# Patient Record
Sex: MALE | Race: WHITE | NOT HISPANIC OR LATINO | Employment: FULL TIME | URBAN - METROPOLITAN AREA
[De-identification: names, ages, dates, MRNs, and addresses within clinical notes are randomized per-mention and may not be internally consistent; named-entity substitution may affect disease eponyms.]

---

## 2019-07-01 ENCOUNTER — OFFICE VISIT (OUTPATIENT)
Dept: SPORTS MEDICINE | Facility: CLINIC | Age: 44
End: 2019-07-01

## 2019-07-01 VITALS
HEART RATE: 84 BPM | DIASTOLIC BLOOD PRESSURE: 60 MMHG | BODY MASS INDEX: 24.39 KG/M2 | HEIGHT: 73 IN | WEIGHT: 184 LBS | OXYGEN SATURATION: 99 % | SYSTOLIC BLOOD PRESSURE: 140 MMHG

## 2019-07-01 DIAGNOSIS — R41.840 DIFFICULTY CONCENTRATING: Primary | ICD-10-CM

## 2019-07-01 DIAGNOSIS — R03.0 ELEVATED BLOOD PRESSURE READING WITHOUT DIAGNOSIS OF HYPERTENSION: ICD-10-CM

## 2019-07-01 DIAGNOSIS — R45.4 IRRITABILITY: ICD-10-CM

## 2019-07-01 DIAGNOSIS — G47.9 SLEEP DISTURBANCE: ICD-10-CM

## 2019-07-01 PROCEDURE — 99204 OFFICE O/P NEW MOD 45 MIN: CPT | Performed by: FAMILY MEDICINE

## 2019-07-01 RX ORDER — ATOMOXETINE 40 MG/1
40 CAPSULE ORAL DAILY
Qty: 90 CAPSULE | Refills: 0 | Status: SHIPPED | OUTPATIENT
Start: 2019-07-01 | End: 2019-07-15

## 2019-07-01 RX ORDER — CALCIPOTRIENE, BETAMETHASONE DIPROPIONATE 50; .643 UG/G; MG/G
OINTMENT TOPICAL DAILY
COMMUNITY
End: 2022-10-06

## 2019-07-01 NOTE — PROGRESS NOTES
"Emanuel is a 43 y.o. year old male evaluation of a problem that is new to this examiner.    Chief Complaint   Patient presents with   • Establish Care     wants to talk about ADHD problems he has been having        History of Present Illness   HPI     Prescribed Adderall XR 20 mg daily x 7 wks. Was recommended by previous physician to help with concentration. Had dry mouth but more troubling was difficulty sleeping due to Rx. Stopped Rx on his own 1 wk ago. Inability to organize tasks. Difficulty initiating tasks that are not interesting, especially with work. Impatience. Feeling of jumping out of skin. Mind constantly racing. Irritability.     No problems with BP in past - states systolic was in 120s at last check up.     I have reviewed the patient's medical, family, and social history in detail and updated the computerized patient record.    Review of Systems   Constitutional: Negative for chills, fatigue and fever.   HENT: Negative for postnasal drip and sore throat.    Eyes: Negative for pain.   Respiratory: Negative for cough, chest tightness and wheezing.    Cardiovascular: Negative for chest pain.   Gastrointestinal: Negative.    Musculoskeletal: Negative for myalgias.   Skin: Negative for rash.   Neurological: Negative for numbness and headaches.   Psychiatric/Behavioral: Positive for decreased concentration and sleep disturbance.   All other systems reviewed and are negative.      /60 (BP Location: Right arm, Patient Position: Sitting, Cuff Size: Adult)   Pulse 84   Ht 185.4 cm (73\")   Wt 83.5 kg (184 lb)   SpO2 99%   BMI 24.28 kg/m²      Physical Exam   Constitutional: He is oriented to person, place, and time. He appears well-developed and well-nourished.   HENT:   Head: Normocephalic and atraumatic.   Right Ear: External ear normal.   Left Ear: External ear normal.   Nose: Nose normal.   Mouth/Throat: Oropharynx is clear and moist.   Eyes: EOM are normal.   Neck: Normal range of motion. "   Cardiovascular: Normal rate and regular rhythm.   Pulmonary/Chest: Effort normal and breath sounds normal.   Neurological: He is alert and oriented to person, place, and time.   Skin: Skin is warm and dry. No rash noted.   Psychiatric: He has a normal mood and affect. His behavior is normal.   Nursing note and vitals reviewed.    PHQ-2 Depression Screening  Little interest or pleasure in doing things? 1   Feeling down, depressed, or hopeless? 0   PHQ-2 Total Score 1           Current Outpatient Medications:   •  calcipotriene-betamethasone (TACLONEX) 0.005-0.064 % ointment, Apply  topically to the appropriate area as directed Daily., Disp: , Rfl:   •  atomoxetine (STRATTERA) 40 MG capsule, Take 1 capsule by mouth Daily., Disp: 90 capsule, Rfl: 0     Diagnoses and all orders for this visit:    Difficulty concentrating  -     atomoxetine (STRATTERA) 40 MG capsule; Take 1 capsule by mouth Daily.    Irritability  -     atomoxetine (STRATTERA) 40 MG capsule; Take 1 capsule by mouth Daily.    Sleep disturbance    Elevated blood pressure reading without diagnosis of hypertension    Other orders  -     calcipotriene-betamethasone (TACLONEX) 0.005-0.064 % ointment; Apply  topically to the appropriate area as directed Daily.       Lengthy discussion with Emanuel regarding treatment options.  I certainly think that irritability and his relational problems are the main focus of treatment.  Did discuss Wellbutrin as option but he would like to try Strattera as he did have some positive benefits from Adderall.  I would steer him away from stimulant medications as it was causing significant sleep disturbances.  We also discussed formal testing or referral to clinical psychologist.    EMR Dragon/Transcription disclaimer:    Much of this encounter note is an electronic transcription/translation of spoken language to printed text.  The electronic translation of spoken language may permit erroneous, or at times, nonsensical words or  phrases to be inadvertently transcribed.  Although I have reviewed the note for such errors some may still exist.

## 2019-07-15 ENCOUNTER — TELEPHONE (OUTPATIENT)
Dept: SPORTS MEDICINE | Facility: CLINIC | Age: 44
End: 2019-07-15

## 2019-07-15 DIAGNOSIS — G47.9 SLEEP DISTURBANCE: ICD-10-CM

## 2019-07-15 DIAGNOSIS — R45.4 IRRITABILITY: ICD-10-CM

## 2019-07-15 DIAGNOSIS — R41.840 DIFFICULTY CONCENTRATING: Primary | ICD-10-CM

## 2019-07-15 RX ORDER — BUPROPION HYDROCHLORIDE 150 MG/1
150 TABLET ORAL DAILY
Qty: 90 TABLET | Refills: 0 | Status: SHIPPED | OUTPATIENT
Start: 2019-07-15 | End: 2019-10-11

## 2019-07-15 NOTE — TELEPHONE ENCOUNTER
Pt called giving update to medication, Pt sated he is having difficulty sleeping and does not think the medication has any effectiveness. Please advise.

## 2019-07-15 NOTE — TELEPHONE ENCOUNTER
Discontinue Strattera.  Trial of Wellbutrin sent to pharmacy.  Keep regularly scheduled follow-up in 2 weeks for reevaluation.

## 2019-07-29 ENCOUNTER — OFFICE VISIT (OUTPATIENT)
Dept: SPORTS MEDICINE | Facility: CLINIC | Age: 44
End: 2019-07-29

## 2019-07-29 VITALS
HEART RATE: 74 BPM | DIASTOLIC BLOOD PRESSURE: 64 MMHG | OXYGEN SATURATION: 98 % | BODY MASS INDEX: 24.39 KG/M2 | WEIGHT: 184 LBS | HEIGHT: 73 IN | SYSTOLIC BLOOD PRESSURE: 114 MMHG

## 2019-07-29 DIAGNOSIS — R41.840 DIFFICULTY CONCENTRATING: Primary | ICD-10-CM

## 2019-07-29 DIAGNOSIS — R45.4 IRRITABILITY: ICD-10-CM

## 2019-07-29 PROCEDURE — 99214 OFFICE O/P EST MOD 30 MIN: CPT | Performed by: FAMILY MEDICINE

## 2019-07-29 RX ORDER — DEXTROAMPHETAMINE SACCHARATE, AMPHETAMINE ASPARTATE MONOHYDRATE, DEXTROAMPHETAMINE SULFATE AND AMPHETAMINE SULFATE 5; 5; 5; 5 MG/1; MG/1; MG/1; MG/1
CAPSULE, EXTENDED RELEASE ORAL
COMMUNITY
Start: 2019-05-24 | End: 2019-07-29

## 2019-07-29 NOTE — PROGRESS NOTES
"Emanuel is a 43 y.o. year old male follow up on a problem familiar to this examiner.     Chief Complaint   Patient presents with   • difficulty concentrating     4 week F/U       History of Present Illness   HPI     Follow-up on difficulty concentrating, irritability.  He had adverse effect of difficulty sleeping once again after taking Strattera.  He did trial this for 2 weeks. From phone call with me, he started Wellbutrin and states that the sleep issue has resolved.  Has noticed that some of his irritability is improved.  Still associates some difficulty concentrating.  Did have some difficulty with depressed mood after few days of Wellbutrin but this has resolved.    I have reviewed the patient's medical, family, and social history in detail and updated the computerized patient record.    Review of Systems   Constitutional: Negative for chills, fatigue and fever.   HENT: Negative for congestion, rhinorrhea and sinus pressure.    Respiratory: Negative for chest tightness and shortness of breath.    Cardiovascular: Negative for chest pain.   Gastrointestinal: Negative for abdominal pain.   Musculoskeletal: Negative for arthralgias.   Skin: Negative for rash.   Neurological: Negative for numbness and headaches.   All other systems reviewed and are negative.      /64   Pulse 74   Ht 185.4 cm (72.99\")   Wt 83.5 kg (184 lb)   SpO2 98%   BMI 24.28 kg/m²      Physical Exam   Constitutional: He is oriented to person, place, and time. Vital signs are normal. He appears well-developed and well-nourished.   HENT:   Head: Normocephalic and atraumatic.   Eyes: Conjunctivae and EOM are normal.   Pulmonary/Chest: No accessory muscle usage. No respiratory distress.   Musculoskeletal: He exhibits no deformity.   Neurological: He is alert and oriented to person, place, and time.   Skin: Skin is warm and dry. No rash noted.   Psychiatric: He has a normal mood and affect. His behavior is normal.   Nursing note and vitals " reviewed.        Current Outpatient Medications:   •  buPROPion XL (WELLBUTRIN XL) 150 MG 24 hr tablet, Take 1 tablet by mouth Daily., Disp: 90 tablet, Rfl: 0  •  calcipotriene-betamethasone (TACLONEX) 0.005-0.064 % ointment, Apply  topically to the appropriate area as directed Daily., Disp: , Rfl:      Diagnoses and all orders for this visit:    Difficulty concentrating    Irritability    Other orders  -     Discontinue: amphetamine-dextroamphetamine XR (ADDERALL XR) 20 MG 24 hr capsule; Earliest Fill Date: 5/24/19          He has had some benefit from Wellbutrin albeit minimal from his perspective.  Sleeping problem has resolved.  I would encourage him to continue with Wellbutrin at current dosage.  If he does notice this seems to be stabilizing some of his mood over the next 2 weeks, I can send in refill.  I did also discuss possibility of adjusting dose to 300 mg daily. Discussed outside possibility of IR stimulant though would be very cautious given the sleeping AE the ER caused.    EMR Dragon/Transcription disclaimer:    Much of this encounter note is an electronic transcription/translation of spoken language to printed text.  The electronic translation of spoken language may permit erroneous, or at times, nonsensical words or phrases to be inadvertently transcribed.  Although I have reviewed the note for such errors some may still exist.

## 2019-09-24 DIAGNOSIS — Z13.220 SCREENING CHOLESTEROL LEVEL: ICD-10-CM

## 2019-09-24 DIAGNOSIS — Z00.00 HEALTH CARE MAINTENANCE: Primary | ICD-10-CM

## 2019-09-25 LAB
ALBUMIN SERPL-MCNC: 4.6 G/DL (ref 3.5–5.2)
ALBUMIN/GLOB SERPL: 2 G/DL
ALP SERPL-CCNC: 62 U/L (ref 39–117)
ALT SERPL-CCNC: 21 U/L (ref 1–41)
APPEARANCE UR: CLEAR
AST SERPL-CCNC: 17 U/L (ref 1–40)
BACTERIA #/AREA URNS HPF: NORMAL /HPF
BASOPHILS # BLD AUTO: 0.03 10*3/MM3 (ref 0–0.2)
BASOPHILS NFR BLD AUTO: 0.5 % (ref 0–1.5)
BILIRUB SERPL-MCNC: 0.4 MG/DL (ref 0.2–1.2)
BILIRUB UR QL STRIP: NEGATIVE
BUN SERPL-MCNC: 14 MG/DL (ref 6–20)
BUN/CREAT SERPL: 17.5 (ref 7–25)
CALCIUM SERPL-MCNC: 9.4 MG/DL (ref 8.6–10.5)
CHLORIDE SERPL-SCNC: 102 MMOL/L (ref 98–107)
CHOLEST SERPL-MCNC: 179 MG/DL (ref 0–200)
CHOLEST/HDLC SERPL: 2.93 {RATIO}
CO2 SERPL-SCNC: 28.6 MMOL/L (ref 22–29)
COLOR UR: YELLOW
CREAT SERPL-MCNC: 0.8 MG/DL (ref 0.76–1.27)
EOSINOPHIL # BLD AUTO: 0.09 10*3/MM3 (ref 0–0.4)
EOSINOPHIL NFR BLD AUTO: 1.6 % (ref 0.3–6.2)
EPI CELLS #/AREA URNS HPF: NORMAL /HPF
ERYTHROCYTE [DISTWIDTH] IN BLOOD BY AUTOMATED COUNT: 12.5 % (ref 12.3–15.4)
GLOBULIN SER CALC-MCNC: 2.3 GM/DL
GLUCOSE SERPL-MCNC: 93 MG/DL (ref 65–99)
GLUCOSE UR QL: NEGATIVE
HCT VFR BLD AUTO: 45.2 % (ref 37.5–51)
HDLC SERPL-MCNC: 61 MG/DL (ref 40–60)
HGB BLD-MCNC: 14.5 G/DL (ref 13–17.7)
HGB UR QL STRIP: NEGATIVE
IMM GRANULOCYTES # BLD AUTO: 0.03 10*3/MM3 (ref 0–0.05)
IMM GRANULOCYTES NFR BLD AUTO: 0.5 % (ref 0–0.5)
KETONES UR QL STRIP: NEGATIVE
LDLC SERPL CALC-MCNC: 104 MG/DL (ref 0–100)
LEUKOCYTE ESTERASE UR QL STRIP: NEGATIVE
LYMPHOCYTES # BLD AUTO: 1.69 10*3/MM3 (ref 0.7–3.1)
LYMPHOCYTES NFR BLD AUTO: 29.6 % (ref 19.6–45.3)
MCH RBC QN AUTO: 28.9 PG (ref 26.6–33)
MCHC RBC AUTO-ENTMCNC: 32.1 G/DL (ref 31.5–35.7)
MCV RBC AUTO: 90.2 FL (ref 79–97)
MICRO URNS: NORMAL
MICRO URNS: NORMAL
MONOCYTES # BLD AUTO: 0.58 10*3/MM3 (ref 0.1–0.9)
MONOCYTES NFR BLD AUTO: 10.2 % (ref 5–12)
MUCOUS THREADS URNS QL MICRO: PRESENT /HPF
NEUTROPHILS # BLD AUTO: 3.29 10*3/MM3 (ref 1.7–7)
NEUTROPHILS NFR BLD AUTO: 57.6 % (ref 42.7–76)
NITRITE UR QL STRIP: NEGATIVE
NRBC BLD AUTO-RTO: 0 /100 WBC (ref 0–0.2)
PH UR STRIP: 6.5 [PH] (ref 5–7.5)
PLATELET # BLD AUTO: 238 10*3/MM3 (ref 140–450)
POTASSIUM SERPL-SCNC: 4.4 MMOL/L (ref 3.5–5.2)
PROT SERPL-MCNC: 6.9 G/DL (ref 6–8.5)
PROT UR QL STRIP: NEGATIVE
RBC # BLD AUTO: 5.01 10*6/MM3 (ref 4.14–5.8)
RBC #/AREA URNS HPF: NORMAL /HPF
SODIUM SERPL-SCNC: 143 MMOL/L (ref 136–145)
SP GR UR: 1.02 (ref 1–1.03)
TRIGL SERPL-MCNC: 71 MG/DL (ref 0–150)
URINALYSIS REFLEX: NORMAL
UROBILINOGEN UR STRIP-MCNC: 0.2 MG/DL (ref 0.2–1)
VLDLC SERPL CALC-MCNC: 14.2 MG/DL
WBC # BLD AUTO: 5.71 10*3/MM3 (ref 3.4–10.8)
WBC #/AREA URNS HPF: NORMAL /HPF

## 2019-10-11 ENCOUNTER — OFFICE VISIT (OUTPATIENT)
Dept: SPORTS MEDICINE | Facility: CLINIC | Age: 44
End: 2019-10-11

## 2019-10-11 VITALS
DIASTOLIC BLOOD PRESSURE: 65 MMHG | WEIGHT: 188 LBS | SYSTOLIC BLOOD PRESSURE: 128 MMHG | BODY MASS INDEX: 24.92 KG/M2 | HEIGHT: 73 IN | HEART RATE: 78 BPM | OXYGEN SATURATION: 99 %

## 2019-10-11 DIAGNOSIS — Z00.00 ANNUAL PHYSICAL EXAM: Primary | ICD-10-CM

## 2019-10-11 DIAGNOSIS — R41.840 DIFFICULTY CONCENTRATING: ICD-10-CM

## 2019-10-11 DIAGNOSIS — H93.13 TINNITUS, BILATERAL: ICD-10-CM

## 2019-10-11 PROCEDURE — 99396 PREV VISIT EST AGE 40-64: CPT | Performed by: FAMILY MEDICINE

## 2019-10-11 NOTE — PROGRESS NOTES
"Emanuel Lee is here today for an annual physical exam.     Eating a healthy diet. Exercising routinely.     Declines flu shot.    Swelling and pain with stiffness in hands in AM. Irritating.    Considering hearing test.    Stopped Wellbutrin on his own. Will try to manage difficulty concentrating on his own.    I have reviewed the patient's medical, family, and social history in detail and updated the computerized patient record.    Health Maintenance   Topic Date Due   • ANNUAL PHYSICAL  09/16/1980   • TDAP/TD VACCINES (2 - Tdap) 12/19/2027   • INFLUENZA VACCINE  Addressed       PHQ-2 Depression Screening  Little interest or pleasure in doing things? 0   Feeling down, depressed, or hopeless? 0   PHQ-2 Total Score 0       Review of Systems  Constitutional: Negative for chills, fatigue and fever.   HENT: Negative for postnasal drip and sore throat.    Eyes: Negative for pain.   Respiratory: Negative for cough, chest tightness and wheezing.    Cardiovascular: Negative for chest pain.   Gastrointestinal: Negative.    Musculoskeletal: Negative for myalgias.   Skin: Negative for rash.   Neurological: Negative for numbness and headaches.   Psychiatric/Behavioral: Negative.    All other systems reviewed and are negative.    /65 (BP Location: Right arm, Patient Position: Sitting, Cuff Size: Adult)   Pulse 78   Ht 185.4 cm (72.99\")   Wt 85.3 kg (188 lb)   SpO2 99%   BMI 24.81 kg/m²      Physical Exam    Vital signs reviewed.  General appearance: No acute distress  Eyes: conjunctiva clear without erythema; pupils equally round and reactive  ENT: external ears and nose normal; hearing normal, oropharynx clear  Neck: supple; no thyromegaly  CV: normal rate and rhythm; no peripheral edema  Respiratory: normal respiratory effort; lungs clear to auscultation bilaterally  MSK: normal gait and station; no focal joint deformity or swelling  Skin: no rash or wounds; normal turgor  Neuro: cranial nerves 2-12 grossly intact; " normal sensation to light touch  Psych: mood and affect normal; recent and remote memory intact    No visits with results within 2 Week(s) from this visit.   Latest known visit with results is:   Orders Only on 09/24/2019   Component Date Value Ref Range Status   • WBC 09/24/2019 5.71  3.40 - 10.80 10*3/mm3 Final   • RBC 09/24/2019 5.01  4.14 - 5.80 10*6/mm3 Final   • Hemoglobin 09/24/2019 14.5  13.0 - 17.7 g/dL Final   • Hematocrit 09/24/2019 45.2  37.5 - 51.0 % Final   • MCV 09/24/2019 90.2  79.0 - 97.0 fL Final   • MCH 09/24/2019 28.9  26.6 - 33.0 pg Final   • MCHC 09/24/2019 32.1  31.5 - 35.7 g/dL Final   • RDW 09/24/2019 12.5  12.3 - 15.4 % Final   • Platelets 09/24/2019 238  140 - 450 10*3/mm3 Final   • Neutrophil Rel % 09/24/2019 57.6  42.7 - 76.0 % Final   • Lymphocyte Rel % 09/24/2019 29.6  19.6 - 45.3 % Final   • Monocyte Rel % 09/24/2019 10.2  5.0 - 12.0 % Final   • Eosinophil Rel % 09/24/2019 1.6  0.3 - 6.2 % Final   • Basophil Rel % 09/24/2019 0.5  0.0 - 1.5 % Final   • Neutrophils Absolute 09/24/2019 3.29  1.70 - 7.00 10*3/mm3 Final   • Lymphocytes Absolute 09/24/2019 1.69  0.70 - 3.10 10*3/mm3 Final   • Monocytes Absolute 09/24/2019 0.58  0.10 - 0.90 10*3/mm3 Final   • Eosinophils Absolute 09/24/2019 0.09  0.00 - 0.40 10*3/mm3 Final   • Basophils Absolute 09/24/2019 0.03  0.00 - 0.20 10*3/mm3 Final   • Immature Granulocyte Rel % 09/24/2019 0.5  0.0 - 0.5 % Final   • Immature Grans Absolute 09/24/2019 0.03  0.00 - 0.05 10*3/mm3 Final   • nRBC 09/24/2019 0.0  0.0 - 0.2 /100 WBC Final   • Glucose 09/24/2019 93  65 - 99 mg/dL Final   • BUN 09/24/2019 14  6 - 20 mg/dL Final   • Creatinine 09/24/2019 0.80  0.76 - 1.27 mg/dL Final   • eGFR Non  Am 09/24/2019 106  >60 mL/min/1.73 Final   • eGFR African Am 09/24/2019 128  >60 mL/min/1.73 Final   • BUN/Creatinine Ratio 09/24/2019 17.5  7.0 - 25.0 Final   • Sodium 09/24/2019 143  136 - 145 mmol/L Final   • Potassium 09/24/2019 4.4  3.5 - 5.2 mmol/L Final    • Chloride 09/24/2019 102  98 - 107 mmol/L Final   • Total CO2 09/24/2019 28.6  22.0 - 29.0 mmol/L Final   • Calcium 09/24/2019 9.4  8.6 - 10.5 mg/dL Final   • Total Protein 09/24/2019 6.9  6.0 - 8.5 g/dL Final   • Albumin 09/24/2019 4.60  3.50 - 5.20 g/dL Final   • Globulin 09/24/2019 2.3  gm/dL Final   • A/G Ratio 09/24/2019 2.0  g/dL Final   • Total Bilirubin 09/24/2019 0.4  0.2 - 1.2 mg/dL Final   • Alkaline Phosphatase 09/24/2019 62  39 - 117 U/L Final   • AST (SGOT) 09/24/2019 17  1 - 40 U/L Final   • ALT (SGPT) 09/24/2019 21  1 - 41 U/L Final   • Total Cholesterol 09/24/2019 179  0 - 200 mg/dL Final   • Triglycerides 09/24/2019 71  0 - 150 mg/dL Final   • HDL Cholesterol 09/24/2019 61* 40 - 60 mg/dL Final   • VLDL Cholesterol 09/24/2019 14.2  mg/dL Final   • LDL Cholesterol  09/24/2019 104* 0 - 100 mg/dL Final   • Chol/HDL Ratio 09/24/2019 2.93   Final   • Specific Gravity, UA 09/24/2019 1.021  1.005 - 1.030 Final   • pH, UA 09/24/2019 6.5  5.0 - 7.5 Final   • Color, UA 09/24/2019 Yellow  Yellow Final   • Appearance, UA 09/24/2019 Clear  Clear Final   • Leukocytes, UA 09/24/2019 Negative  Negative Final   • Protein 09/24/2019 Negative  Negative/Trace Final   • Glucose, UA 09/24/2019 Negative  Negative Final   • Ketones 09/24/2019 Negative  Negative Final   • Blood, UA 09/24/2019 Negative  Negative Final   • Bilirubin, UA 09/24/2019 Negative  Negative Final   • Urobilinogen, UA 09/24/2019 0.2  0.2 - 1.0 mg/dL Final   • Nitrite, UA 09/24/2019 Negative  Negative Final   • Microscopic Examination 09/24/2019 Comment   Final    Microscopic follows if indicated.   • Microscopic Examination 09/24/2019 See below:   Final    Microscopic was indicated and was performed.   • Urinalysis Reflex 09/24/2019 Comment   Final    This specimen will not reflex to a Urine Culture.   • WBC, UA 09/24/2019 0-5  0 - 5 /hpf Final   • RBC, UA 09/24/2019 0-2  0 - 2 /hpf Final   • Epithelial Cells (non renal) 09/24/2019 None seen  0 -  10 /hpf Final   • Mucus, UA 09/24/2019 Present  Not Estab. /hpf Final   • Bacteria, UA 09/24/2019 None seen  None seen/Few /hpf Final        Emanuel was seen today for annual exam.    Diagnoses and all orders for this visit:    Annual physical exam    Tinnitus, bilateral    Difficulty concentrating        Encourage healthy diet and exercise.  Encourage patient to stay up to date on screening examinations as indicated based on age and risk factors.    EMR Dragon/Transcription disclaimer:    Much of this encounter note is an electronic transcription/translation of spoken language to printed text.  The electronic translation of spoken language may permit erroneous, or at times, nonsensical words or phrases to be inadvertently transcribed.  Although I have reviewed the note for such errors some may still exist.

## 2022-10-03 ENCOUNTER — TELEPHONE (OUTPATIENT)
Dept: SPORTS MEDICINE | Facility: CLINIC | Age: 47
End: 2022-10-03

## 2022-10-03 NOTE — TELEPHONE ENCOUNTER
Patient's wife and patient himself called today wanting to schedule an annual physical but it has been 3 yrs since he has been seen in the office. He would like to know if  could make an exception and see him. I informed the patient that I would have to wait until  came back into the office to ask if we can override [employer] protocols. Patient states that he already texted  and was checking to see his response. Informed the patient that  was not in the office but I assured him that I would follow up as soon as I heard back from .     Please advise, if we can schedule him for CPE. Thank you!    -TOM Garland

## 2022-10-13 NOTE — TELEPHONE ENCOUNTER
I SPOKE WITH PATIENT, relayed the message below. Patient verbalized understanding and had no further requests.     -TOM Garland

## 2023-02-02 ENCOUNTER — OFFICE VISIT (OUTPATIENT)
Dept: FAMILY MEDICINE CLINIC | Facility: CLINIC | Age: 48
End: 2023-02-02
Payer: COMMERCIAL

## 2023-02-02 VITALS
OXYGEN SATURATION: 98 % | DIASTOLIC BLOOD PRESSURE: 80 MMHG | RESPIRATION RATE: 20 BRPM | BODY MASS INDEX: 25.84 KG/M2 | HEIGHT: 73 IN | SYSTOLIC BLOOD PRESSURE: 110 MMHG | HEART RATE: 78 BPM | TEMPERATURE: 97.1 F | WEIGHT: 195 LBS

## 2023-02-02 DIAGNOSIS — Z12.11 SCREENING FOR COLON CANCER: ICD-10-CM

## 2023-02-02 DIAGNOSIS — Z12.11 SCREEN FOR COLON CANCER: ICD-10-CM

## 2023-02-02 DIAGNOSIS — Z12.5 SCREENING FOR PROSTATE CANCER: ICD-10-CM

## 2023-02-02 DIAGNOSIS — Z00.00 ANNUAL PHYSICAL EXAM: Primary | ICD-10-CM

## 2023-02-02 PROCEDURE — 99386 PREV VISIT NEW AGE 40-64: CPT | Performed by: FAMILY MEDICINE

## 2023-02-02 NOTE — PROGRESS NOTES
"Chief Complaint  Chief Complaint   Patient presents with   • Establish Care     New pt   • Annual Exam     Physical w labs        Subjective    History of Present Illness        Emanuel Lee presents to [facility].  Emanuel Lee is a 47 y.o. male here for his annual physical with me. Emanuel is here for coordination of medical care, to discuss health maintenance, disease prevention as well as to followup on medical problems. Patient has been followed by me since 02/02/2023. Patient's last CPE was 2019. Activity level is moderate. Exercises 2 per week. Appetite is good. Feels fairly well with none complaints. Energy level is good. Sleeps poorly.       History of Present Illness  The patient is a 47-year-old male who presents today for an annual physical.    Health maintenance  The patient reports that he has not been seen by a primary care physician in over 3 years. The patients activity level is moderate and he exercises 2 days a week and working to increase that. He feels fairly well overall and reports that his appetite is good. His energy level is good, but he reports that he does not sleep very well at this time. He has trouble getting to sleep at night because he has always struggled to settle his brain down to sleep. He is due for a colonoscopy and denies any family history of colon cancer.         Objective   Vital Signs:   Visit Vitals  /80   Pulse 78   Temp 97.1 °F (36.2 °C)   Resp 20   Ht 185.4 cm (73\")   Wt 88.5 kg (195 lb)   SpO2 98%   BMI 25.73 kg/m²       BMI is >= 25 and <30. (Overweight) The following options were offered after discussion;: weight loss educational material (shared in after visit summary)     Physical Exam  Vitals reviewed.   Constitutional:       Appearance: He is well-developed.   HENT:      Head: Normocephalic and atraumatic.      Nose: Nose normal.   Eyes:      General: Lids are normal.      Conjunctiva/sclera: Conjunctivae normal.      Pupils: " Pupils are equal, round, and reactive to light.   Cardiovascular:      Rate and Rhythm: Normal rate and regular rhythm.      Pulses: Normal pulses.      Heart sounds: Normal heart sounds.   Pulmonary:      Effort: Pulmonary effort is normal. No respiratory distress.      Breath sounds: Normal breath sounds.   Abdominal:      General: Bowel sounds are normal.      Palpations: Abdomen is soft.   Musculoskeletal:         General: Normal range of motion.      Cervical back: Normal range of motion and neck supple.   Skin:     General: Skin is warm and dry.      Capillary Refill: Capillary refill takes less than 2 seconds.   Neurological:      Mental Status: He is alert and oriented to person, place, and time.   Psychiatric:         Behavior: Behavior normal.         Thought Content: Thought content normal.         Judgment: Judgment normal.            Result Review :                    Assessment and Plan      Diagnoses and all orders for this visit:    1. Annual physical exam (Primary)  Assessment & Plan:  - Orders placed for annual laboratory tests for patient to complete.  - Patient prefers to complete a [facility] home screening test at this time.  - He will return in 2024 for next physical unless he needs to be seen for anything else.    Orders:  -     CBC & Differential  -     Comprehensive Metabolic Panel  -     Lipid Panel With / Chol / HDL Ratio  -     TSH    2. Screening for prostate cancer  -     PSA Screen    3. Screening for colon cancer  -     Cologuard - Stool, Per Rectum; Future    4. Screen for colon cancer  -     Cologuard - Stool, Per Rectum; Future        Follow Up   No follow-ups on file.  Patient was given instructions and counseling regarding his condition or for health maintenance advice. Please see specific information pulled into the AVS if appropriate.      Transcribed from ambient dictation for Felix He Sr, MD by Cathy Boston.  02/02/23   16:58 EST    Patient or patient representative  verbalized consent to the visit recording.  I have personally performed the services described in this document as transcribed by the above individual, and it is both accurate and complete.

## 2023-02-03 PROBLEM — Z00.00 ANNUAL PHYSICAL EXAM: Status: ACTIVE | Noted: 2023-02-03

## 2023-02-03 NOTE — ASSESSMENT & PLAN NOTE
- Orders placed for annual laboratory tests for patient to complete.  - Patient prefers to complete a [employer] home screening test at this time.  - He will return in 2024 for next physical unless he needs to be seen for anything else.

## 2023-02-07 ENCOUNTER — PATIENT ROUNDING (BHMG ONLY) (OUTPATIENT)
Dept: FAMILY MEDICINE CLINIC | Facility: CLINIC | Age: 48
End: 2023-02-07
Payer: COMMERCIAL

## 2023-02-10 LAB
ALBUMIN SERPL-MCNC: 4.8 G/DL (ref 3.5–5.2)
ALBUMIN/GLOB SERPL: 2.2 G/DL
ALP SERPL-CCNC: 61 U/L (ref 39–117)
ALT SERPL-CCNC: 37 U/L (ref 1–41)
AST SERPL-CCNC: 38 U/L (ref 1–40)
BASOPHILS # BLD AUTO: 0.04 10*3/MM3 (ref 0–0.2)
BASOPHILS NFR BLD AUTO: 0.7 % (ref 0–1.5)
BILIRUB SERPL-MCNC: 0.5 MG/DL (ref 0–1.2)
BUN SERPL-MCNC: 18 MG/DL (ref 6–20)
BUN/CREAT SERPL: 16.8 (ref 7–25)
CALCIUM SERPL-MCNC: 10 MG/DL (ref 8.6–10.5)
CHLORIDE SERPL-SCNC: 103 MMOL/L (ref 98–107)
CHOLEST SERPL-MCNC: 209 MG/DL (ref 0–200)
CHOLEST/HDLC SERPL: 3.32 {RATIO}
CO2 SERPL-SCNC: 29.8 MMOL/L (ref 22–29)
CREAT SERPL-MCNC: 1.07 MG/DL (ref 0.76–1.27)
EGFRCR SERPLBLD CKD-EPI 2021: 87.2 ML/MIN/1.73
EOSINOPHIL # BLD AUTO: 0.11 10*3/MM3 (ref 0–0.4)
EOSINOPHIL NFR BLD AUTO: 2 % (ref 0.3–6.2)
ERYTHROCYTE [DISTWIDTH] IN BLOOD BY AUTOMATED COUNT: 12.1 % (ref 12.3–15.4)
GLOBULIN SER CALC-MCNC: 2.2 GM/DL
GLUCOSE SERPL-MCNC: 104 MG/DL (ref 65–99)
HCT VFR BLD AUTO: 45.1 % (ref 37.5–51)
HDLC SERPL-MCNC: 63 MG/DL (ref 40–60)
HGB BLD-MCNC: 15.1 G/DL (ref 13–17.7)
IMM GRANULOCYTES # BLD AUTO: 0.02 10*3/MM3 (ref 0–0.05)
IMM GRANULOCYTES NFR BLD AUTO: 0.4 % (ref 0–0.5)
LDLC SERPL CALC-MCNC: 137 MG/DL (ref 0–100)
LYMPHOCYTES # BLD AUTO: 1.81 10*3/MM3 (ref 0.7–3.1)
LYMPHOCYTES NFR BLD AUTO: 33.6 % (ref 19.6–45.3)
MCH RBC QN AUTO: 29.8 PG (ref 26.6–33)
MCHC RBC AUTO-ENTMCNC: 33.5 G/DL (ref 31.5–35.7)
MCV RBC AUTO: 89.1 FL (ref 79–97)
MONOCYTES # BLD AUTO: 0.55 10*3/MM3 (ref 0.1–0.9)
MONOCYTES NFR BLD AUTO: 10.2 % (ref 5–12)
NEUTROPHILS # BLD AUTO: 2.86 10*3/MM3 (ref 1.7–7)
NEUTROPHILS NFR BLD AUTO: 53.1 % (ref 42.7–76)
NRBC BLD AUTO-RTO: 0 /100 WBC (ref 0–0.2)
PLATELET # BLD AUTO: 243 10*3/MM3 (ref 140–450)
POTASSIUM SERPL-SCNC: 4.6 MMOL/L (ref 3.5–5.2)
PROT SERPL-MCNC: 7 G/DL (ref 6–8.5)
PSA SERPL-MCNC: 1.3 NG/ML (ref 0–4)
RBC # BLD AUTO: 5.06 10*6/MM3 (ref 4.14–5.8)
SODIUM SERPL-SCNC: 139 MMOL/L (ref 136–145)
TRIGL SERPL-MCNC: 49 MG/DL (ref 0–150)
TSH SERPL DL<=0.005 MIU/L-ACNC: 1.52 UIU/ML (ref 0.27–4.2)
VLDLC SERPL CALC-MCNC: 9 MG/DL (ref 5–40)
WBC # BLD AUTO: 5.39 10*3/MM3 (ref 3.4–10.8)

## 2023-03-30 ENCOUNTER — TELEPHONE (OUTPATIENT)
Dept: FAMILY MEDICINE CLINIC | Facility: CLINIC | Age: 48
End: 2023-03-30
Payer: COMMERCIAL

## 2023-03-30 DIAGNOSIS — E78.00 ELEVATED CHOLESTEROL: Primary | ICD-10-CM

## 2023-03-30 NOTE — TELEPHONE ENCOUNTER
Hub staff attempted to follow warm transfer process and was unsuccessful     Caller: CHAS JUAREZ    Relationship to patient: Emergency Contact    Best call back number: [phone]    Patient is needing: PATIENTS WIFE STATES THAT HE IS NEEDING TO DO LABS AGAIN IN June. PLEASE REACH OUT TO SCHEDULE

## 2024-08-22 ENCOUNTER — OFFICE VISIT (OUTPATIENT)
Dept: SPORTS MEDICINE | Facility: CLINIC | Age: 49
End: 2024-08-22
Payer: COMMERCIAL

## 2024-08-22 ENCOUNTER — LAB (OUTPATIENT)
Dept: LAB | Facility: HOSPITAL | Age: 49
End: 2024-08-22
Payer: COMMERCIAL

## 2024-08-22 VITALS
WEIGHT: 190 LBS | TEMPERATURE: 97.4 F | DIASTOLIC BLOOD PRESSURE: 74 MMHG | SYSTOLIC BLOOD PRESSURE: 112 MMHG | HEART RATE: 62 BPM | BODY MASS INDEX: 25.18 KG/M2 | OXYGEN SATURATION: 97 % | HEIGHT: 73 IN

## 2024-08-22 DIAGNOSIS — Z12.5 SCREENING FOR PROSTATE CANCER: ICD-10-CM

## 2024-08-22 DIAGNOSIS — Z00.00 ANNUAL PHYSICAL EXAM: Primary | ICD-10-CM

## 2024-08-22 LAB
ALBUMIN SERPL-MCNC: 4.3 G/DL (ref 3.5–5.2)
ALBUMIN/GLOB SERPL: 1.5 G/DL
ALP SERPL-CCNC: 76 U/L (ref 39–117)
ALT SERPL W P-5'-P-CCNC: 28 U/L (ref 1–41)
ANION GAP SERPL CALCULATED.3IONS-SCNC: 7.2 MMOL/L (ref 5–15)
AST SERPL-CCNC: 19 U/L (ref 1–40)
BASOPHILS # BLD AUTO: 0.04 10*3/MM3 (ref 0–0.2)
BASOPHILS NFR BLD AUTO: 0.6 % (ref 0–1.5)
BILIRUB SERPL-MCNC: 0.3 MG/DL (ref 0–1.2)
BILIRUB UR QL STRIP: NEGATIVE
BUN SERPL-MCNC: 13 MG/DL (ref 6–20)
BUN/CREAT SERPL: 14.1 (ref 7–25)
CALCIUM SPEC-SCNC: 9.6 MG/DL (ref 8.6–10.5)
CHLORIDE SERPL-SCNC: 105 MMOL/L (ref 98–107)
CHOLEST SERPL-MCNC: 199 MG/DL (ref 0–200)
CLARITY UR: CLEAR
CO2 SERPL-SCNC: 27.8 MMOL/L (ref 22–29)
COLOR UR: YELLOW
CREAT SERPL-MCNC: 0.92 MG/DL (ref 0.76–1.27)
DEPRECATED RDW RBC AUTO: 39.2 FL (ref 37–54)
EGFRCR SERPLBLD CKD-EPI 2021: 103.9 ML/MIN/1.73
EOSINOPHIL # BLD AUTO: 0.11 10*3/MM3 (ref 0–0.4)
EOSINOPHIL NFR BLD AUTO: 1.8 % (ref 0.3–6.2)
ERYTHROCYTE [DISTWIDTH] IN BLOOD BY AUTOMATED COUNT: 11.9 % (ref 12.3–15.4)
GLOBULIN UR ELPH-MCNC: 2.8 GM/DL
GLUCOSE SERPL-MCNC: 103 MG/DL (ref 65–99)
GLUCOSE UR STRIP-MCNC: NEGATIVE MG/DL
HBA1C MFR BLD: 5.7 % (ref 4.8–5.6)
HCT VFR BLD AUTO: 44.3 % (ref 37.5–51)
HDLC SERPL QL: 3.98
HDLC SERPL-MCNC: 50 MG/DL (ref 40–60)
HGB BLD-MCNC: 14.7 G/DL (ref 13–17.7)
HGB UR QL STRIP.AUTO: NEGATIVE
IMM GRANULOCYTES # BLD AUTO: 0.07 10*3/MM3 (ref 0–0.05)
IMM GRANULOCYTES NFR BLD AUTO: 1.1 % (ref 0–0.5)
KETONES UR QL STRIP: NEGATIVE
LDLC SERPL CALC-MCNC: 136 MG/DL (ref 0–100)
LEUKOCYTE ESTERASE UR QL STRIP.AUTO: NEGATIVE
LYMPHOCYTES # BLD AUTO: 1.87 10*3/MM3 (ref 0.7–3.1)
LYMPHOCYTES NFR BLD AUTO: 30.3 % (ref 19.6–45.3)
MCH RBC QN AUTO: 29.8 PG (ref 26.6–33)
MCHC RBC AUTO-ENTMCNC: 33.2 G/DL (ref 31.5–35.7)
MCV RBC AUTO: 89.9 FL (ref 79–97)
MONOCYTES # BLD AUTO: 0.47 10*3/MM3 (ref 0.1–0.9)
MONOCYTES NFR BLD AUTO: 7.6 % (ref 5–12)
NEUTROPHILS NFR BLD AUTO: 3.61 10*3/MM3 (ref 1.7–7)
NEUTROPHILS NFR BLD AUTO: 58.6 % (ref 42.7–76)
NITRITE UR QL STRIP: NEGATIVE
NRBC BLD AUTO-RTO: 0 /100 WBC (ref 0–0.2)
PH UR STRIP.AUTO: 6 [PH] (ref 5–8)
PLATELET # BLD AUTO: 243 10*3/MM3 (ref 140–450)
PMV BLD AUTO: 8.9 FL (ref 6–12)
POTASSIUM SERPL-SCNC: 4.9 MMOL/L (ref 3.5–5.2)
PROT SERPL-MCNC: 7.1 G/DL (ref 6–8.5)
PROT UR QL STRIP: NEGATIVE
PSA SERPL-MCNC: 1.78 NG/ML (ref 0–4)
RBC # BLD AUTO: 4.93 10*6/MM3 (ref 4.14–5.8)
SODIUM SERPL-SCNC: 140 MMOL/L (ref 136–145)
SP GR UR STRIP: 1.02 (ref 1–1.03)
TRIGL SERPL-MCNC: 73 MG/DL (ref 0–150)
UROBILINOGEN UR QL STRIP: NORMAL
VLDLC SERPL-MCNC: 13 MG/DL (ref 5–40)
WBC NRBC COR # BLD AUTO: 6.17 10*3/MM3 (ref 3.4–10.8)

## 2024-08-22 PROCEDURE — 81003 URINALYSIS AUTO W/O SCOPE: CPT | Performed by: FAMILY MEDICINE

## 2024-08-22 PROCEDURE — G0103 PSA SCREENING: HCPCS | Performed by: FAMILY MEDICINE

## 2024-08-22 PROCEDURE — 36415 COLL VENOUS BLD VENIPUNCTURE: CPT | Performed by: FAMILY MEDICINE

## 2024-08-22 PROCEDURE — 83036 HEMOGLOBIN GLYCOSYLATED A1C: CPT | Performed by: FAMILY MEDICINE

## 2024-08-22 PROCEDURE — 80061 LIPID PANEL: CPT | Performed by: FAMILY MEDICINE

## 2024-08-22 PROCEDURE — 99396 PREV VISIT EST AGE 40-64: CPT | Performed by: FAMILY MEDICINE

## 2024-08-22 PROCEDURE — 80050 GENERAL HEALTH PANEL: CPT | Performed by: FAMILY MEDICINE

## 2024-08-22 NOTE — PROGRESS NOTES
"Emanuel Lee is here today for an annual physical exam.     Overall well without concerns.  He eats a generally healthy diet but is trying to clean that up a little bit.  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  He has tried some weightlifting before but felt like he was being pushed more than was going to be helpful.  Intermittent discomfort the right great toe.    PHQ-2 Depression Screening  Little interest or pleasure in doing things? 0-->not at all   Feeling down, depressed, or hopeless? 0-->not at all   PHQ-2 Total Score 0       Health Maintenance   Topic Date Due    HEPATITIS C SCREENING  Never done    COVID-19 Vaccine (3 - 2023-24 season) 09/01/2023    ANNUAL PHYSICAL  02/02/2024    BMI FOLLOWUP  02/02/2024    LIPID PANEL  02/10/2024    INFLUENZA VACCINE  08/01/2024    COLORECTAL CANCER SCREENING  03/28/2026    TDAP/TD VACCINES (2 - Tdap) 12/19/2027    Pneumococcal Vaccine 0-64  Aged Out       Review of Systems    /74 (BP Location: Right arm, Patient Position: Sitting, Cuff Size: Adult)   Pulse 62   Temp 97.4 °F (36.3 °C) (Temporal)   Ht 185.4 cm (73\")   Wt 86.2 kg (190 lb)   SpO2 97%   BMI 25.07 kg/m²      BMI is >= 25 and <30. (Overweight) The following options were offered after discussion;: exercise counseling/recommendations and nutrition counseling/recommendations      Physical Exam    Vital signs reviewed.  General appearance: No acute distress  Eyes: conjunctiva clear without erythema; pupils equally round and reactive  ENT: external ears normal; hearing normal  Neck: supple; no thyromegaly  CV: normal rate and rhythm; no peripheral edema  Respiratory: normal respiratory effort; lungs clear to auscultation bilaterally  MSK: normal gait and station;   *Right great toe mild hypertrophic changes at the MTP and IP joint.  There is soft tissue mass on the plantar aspect of the proximal phalanx suggestive of lipoma.  Patient states this has " been here for many years and has not changed.  Skin: no rash or wounds; normal turgor  Neuro: cranial nerves 2-12 grossly intact; normal sensation to light touch  Psych: mood and affect normal; recent and remote memory intact    No current outpatient medications on file.    Diagnoses and all orders for this visit:    1. Annual physical exam (Primary)  -     CBC & Differential  -     Comprehensive Metabolic Panel  -     Lipid Panel With / Chol / HDL Ratio  -     Thyroid Cascade Profile  -     Urinalysis With Culture If Indicated - Urine, Clean Catch  -     Hemoglobin A1c  -     PSA Screen  -     [place] Urine Culture Tube - Urine, Clean Catch    2. Screening for prostate cancer  -     PSA Screen        Encourage healthy diet and exercise.  Encourage patient to stay up to date on screening examinations as indicated based on age and risk factors.  Discussed cancer screening.  Start PSA baseline.  Cologuard last year was normal.  Discussed pros and cons of Cologuard and consider colonoscopy in the future.    EMR Dragon/Transcription disclaimer:    Much of this encounter note is an electronic transcription/translation of spoken language to printed text.  The electronic translation of spoken language may permit erroneous, or at times, nonsensical words or phrases to be inadvertently transcribed.  Although I have reviewed the note for such errors some may still exist.

## 2024-08-23 LAB — TSH SERPL DL<=0.005 MIU/L-ACNC: 1.47 UIU/ML (ref 0.45–4.5)

## 2025-08-25 ENCOUNTER — PATIENT ROUNDING (BHMG ONLY) (OUTPATIENT)
Dept: URGENT CARE | Facility: CLINIC | Age: 50
End: 2025-08-25
Payer: COMMERCIAL

## 2025-08-29 ENCOUNTER — TELEPHONE (OUTPATIENT)
Dept: SPORTS MEDICINE | Facility: CLINIC | Age: 50
End: 2025-08-29
Payer: COMMERCIAL

## 2025-08-29 DIAGNOSIS — Z13.29 THYROID DISORDER SCREEN: ICD-10-CM

## 2025-08-29 DIAGNOSIS — Z12.5 SCREENING FOR PROSTATE CANCER: ICD-10-CM

## 2025-08-29 DIAGNOSIS — Z00.00 ANNUAL PHYSICAL EXAM: Primary | ICD-10-CM
